# Patient Record
Sex: MALE | Race: WHITE | Employment: FULL TIME | URBAN - METROPOLITAN AREA
[De-identification: names, ages, dates, MRNs, and addresses within clinical notes are randomized per-mention and may not be internally consistent; named-entity substitution may affect disease eponyms.]

---

## 2020-09-06 ENCOUNTER — APPOINTMENT (OUTPATIENT)
Dept: CT IMAGING | Facility: HOSPITAL | Age: 50
End: 2020-09-06
Attending: EMERGENCY MEDICINE
Payer: COMMERCIAL

## 2020-09-06 PROCEDURE — 70450 CT HEAD/BRAIN W/O DYE: CPT | Performed by: EMERGENCY MEDICINE

## 2020-09-06 NOTE — ED PROVIDER NOTES
Patient Seen in: [facility] Emergency Department      History   Patient presents with:  Fall  Laceration Abrasion    Stated Complaint:     HPI    Petra Mcmillan is a pleasant 59-year-old male who is visiting the area and states he was drinking alcohol this e the face. The largest laceration is slightly irregular measuring approximately 5 cm and the smaller laceration near the midline is approximately 2 cm.        ED Course   Labs Reviewed - No data to display       Patient wound was locally anesthetized using

## 2020-09-06 NOTE — ED INITIAL ASSESSMENT (HPI)
Pt presents to ED with laceration to forhead following fall onto concrete nwhilr climbing over wall  after leaving bar. Pt denies LOC.